# Patient Record
Sex: FEMALE | Race: OTHER
[De-identification: names, ages, dates, MRNs, and addresses within clinical notes are randomized per-mention and may not be internally consistent; named-entity substitution may affect disease eponyms.]

---

## 2019-01-29 ENCOUNTER — HOSPITAL ENCOUNTER (EMERGENCY)
Dept: HOSPITAL 59 - ER | Age: 44
Discharge: HOME | End: 2019-01-29
Payer: MEDICAID

## 2019-01-29 DIAGNOSIS — F17.210: ICD-10-CM

## 2019-01-29 DIAGNOSIS — R10.11: Primary | ICD-10-CM

## 2019-01-29 DIAGNOSIS — R07.89: ICD-10-CM

## 2019-01-29 LAB
ALBUMIN SERPL-MCNC: 4.1 G/DL (ref 4–5)
ALBUMIN/GLOB SERPL: 1.2 {RATIO} (ref 1.1–1.8)
ALP SERPL-CCNC: 39 U/L (ref 45–87)
ALT SERPL-CCNC: 8 U/L (ref ?–33)
ANION GAP SERPL CALC-SCNC: 11 MMOL/L (ref 7–16)
APPEARANCE UR: CLEAR
AST SERPL-CCNC: 10 U/L (ref 10–35)
BASOPHILS NFR BLD: 0.7 % (ref 0–6)
BILIRUB SERPL-MCNC: 0.2 MG/DL (ref 0.2–1)
BILIRUB UR-MCNC: NEGATIVE MG/DL
BUN SERPL-MCNC: 23 MG/DL (ref 6–20)
CO2 SERPL-SCNC: 25 MMOL/L (ref 22–29)
COLOR UR: YELLOW
CREAT SERPL-MCNC: 0.6 MG/DL (ref 0.5–0.9)
EOSINOPHIL NFR BLD: 5.7 % (ref 0–6)
ERYTHROCYTE [DISTWIDTH] IN BLOOD BY AUTOMATED COUNT: 13.6 % (ref 11.5–14.5)
EST GLOMERULAR FILTRATION RATE: > 60 ML/MIN
GLOBULIN SER-MCNC: 3.4 GM/DL (ref 1.4–4.8)
GLUCOSE SERPL-MCNC: 109 MG/DL (ref 74–109)
GLUCOSE UR STRIP-MCNC: NEGATIVE MG/DL
GRANULOCYTES NFR BLD: 58.2 % (ref 47–80)
HCT VFR BLD CALC: 41.9 % (ref 35–47)
HGB BLD-MCNC: 13.4 GM/DL (ref 11.6–16)
KETONES UR QL STRIP: NEGATIVE
LIPASE SERPL-CCNC: 34 U/L (ref 13–60)
LYMPHOCYTES NFR BLD AUTO: 29 % (ref 16–45)
MCH RBC QN AUTO: 33.7 PG (ref 27–33)
MCHC RBC AUTO-ENTMCNC: 32 G/DL (ref 32–36)
MCV RBC AUTO: 105.3 FL (ref 81–97)
MONOCYTES NFR BLD: 6.4 % (ref 0–9)
NITRITE UR QL STRIP: NEGATIVE
PLATELET # BLD: 393 K/UL (ref 130–400)
PMV BLD AUTO: 9.2 FL (ref 7.4–10.4)
PROT SERPL-MCNC: 7.5 G/DL (ref 6.6–8.7)
PROT UR QL STRIP: NEGATIVE
RBC # BLD AUTO: 3.98 M/UL (ref 3.8–5.4)
RBC # UR STRIP: NEGATIVE /UL
URINE LEUKOCYTE ESTERASE: NEGATIVE
UROBILINOGEN UR STRIP-ACNC: 0.2 E.U./DL (ref 0.2–1)
WBC # BLD AUTO: 12.7 K/UL (ref 4.2–12.2)

## 2019-01-29 PROCEDURE — 81003 URINALYSIS AUTO W/O SCOPE: CPT

## 2019-01-29 PROCEDURE — 80053 COMPREHEN METABOLIC PANEL: CPT

## 2019-01-29 PROCEDURE — 74177 CT ABD & PELVIS W/CONTRAST: CPT

## 2019-01-29 PROCEDURE — 85025 COMPLETE CBC W/AUTO DIFF WBC: CPT

## 2019-01-29 PROCEDURE — 99284 EMERGENCY DEPT VISIT MOD MDM: CPT

## 2019-01-29 PROCEDURE — 99283 EMERGENCY DEPT VISIT LOW MDM: CPT

## 2019-01-29 PROCEDURE — 83690 ASSAY OF LIPASE: CPT

## 2019-01-29 NOTE — EMERGENCY DEPARTMENT RECORD
History of Present Illness





- General


Chief complaint: Pain


Stated complaint: RT SIDE RIB PAIN/SWELLING


Time Seen by Provider: 19 20:40


Source: Patient


Mode of Arrival: Ambulatory


Limitations: No limitations





- History of Present Illness


Initial comments: 





44 yo female presents to ED for evaluation of right sided rib/RUQ abdominal 

pain symptoms for the past 1 day.  Patient reports recent fall with rib 

fractures diagnosed approximately 4 weeks ago, denies fevers, chills, nausea, 

or vomiting symptoms.  Patient denies previous abdominal pain symptoms.  

Patient denies health problems at her baseline.


MD Complaint: Abdominal Pain


Onset/Timin


-: Days(s)


History of Same: No


Quality: Aching


Consistency: Constant


Improves with: Nothing


Worsens with: Palpation


Associated Symptoms: Denies other symptoms





- Related Data


 Home Medications











 Medication  Instructions  Recorded  Confirmed  Last Taken


 


Alprazolam [Xanax] 2 mg PO TID PRN 19 Unknown


 


Birth Control 1 tab PO DAILY 19


 


Gabapentin [Neurontin] 300 mg PO TID 19


 


Hydrocodone/Acetaminophen [Norco 1 tab PO Q8H PRN 19 Unknown





10mg/325mg]    


 


Lithium Carbonate 300 mg PO TID 19











 Allergies











Allergy/AdvReac Type Severity Reaction Status Date / Time


 


cephalexin [From Keflex] Allergy  RASH Verified 19 20:43


 


ciprofloxacin [From Cipro] Allergy  RASH Verified 19 20:43














Review of Systems


Constitutional: Denies: Chills, Fever, Malaise, Night sweats


Eyes: Denies: Eye discharge, Eye pain


ENT: Denies: Congestion, Ear pain, Epistaxis


Respiratory: Denies: Cough, Dyspnea


Cardiovascular: Reports: Chest pain.  Denies: Dyspnea on exertion, Palpitations


Endocrine: Denies: Fatigue, Heat or cold intolerance


Gastrointestinal: Reports: Abdominal pain.  Denies: Constipation, Nausea, 

Vomiting


Genitourinary: Denies: Incontinence, Retention


Musculoskeletal: Denies: Arthralgia, Back pain, Gout, Joint swelling


Skin: Denies: Bruising, Change in color


Neurological: Denies: Abnormal gait, Confusion, Headache, Seizure


Psychiatric: Denies: Anxiety


Hematological/Lymphatic: Denies: Anemia, Blood Clots





Physical Exam





- General


General Appearance: Alert, Oriented x3, Cooperative, Anxious


Limitations: No limitations





- Head


Head exam: Atraumatic, Normocephalic, Normal inspection


Head exam detail: negative: Abrasion, Contusion, Wyatt's sign, General 

tenderness, Hematoma, Laceration





- Eye


Eye exam: Normal appearance.  negative: Conjunctival injection, Periorbital 

swelling, Periorbital tenderness, Scleral icterus





- ENT


Ear exam: negative: Auricular hematoma, Auricular trauma


Nasal Exam: negative: Active bleeding, Discharge, Dried blood, Foreign body


Mouth exam: negative: Drooling, Laceration, Muffled voice, Tongue elevation





- Neck


Neck exam: Normal inspection.  negative: Meningismus, Tenderness





- Respiratory


Respiratory exam: Normal lung sounds bilaterally, Chest wall tenderness (Right 

lower chest wall, RUQ abdomen).  negative: Rales, Respiratory distress, Rhonchi

, Stridor





- Cardiovascular


Cardiovascular Exam: Regular rate, Normal rhythm, Normal heart sounds





- GI/Abdominal


GI/Abdominal exam: Soft, Tenderness (TTP RUQ, no rebound or guarding symptoms 

are present on examination.).  negative: Rebound, Rigid





- Rectal


Rectal exam: Deferred





- 


 exam: Deferred





- Extremities


Extremities exam: Normal inspection.  negative: Calf tenderness, Pedal edema, 

Tenderness





- Back


Back exam: Denies: CVA tenderness (R), CVA tenderness (L)





- Neurological


Neurological exam: Alert, Normal gait, Oriented X3





- Psychiatric


Psychiatric exam: Normal affect, Normal mood





- Skin


Skin exam: Normal color.  negative: Abrasion


Type of lesion: negative: abrasion





Course





- Reevaluation(s)


Reevaluation #1: 





19 21:29


Laboratory studies were reviewed, WBC 12.7, labs are otherwise grossly 

unremarkable for an acute process.


Reevaluation #2: 





19 21:50


CT Abdomen and Pelvis:


No acute process


No cholithiasis, cholecystitis





Patient was updated on all results, recommended f/u with the patient's PCP for 

further evaluation of the gallbladder as an outpatient.


Patient is resting comfortably on re-examination.


Patient appears stable for discharge at this time.





Medical Decision Making





- Lab Data


Result diagrams: 


 19 21:04





 19 21:04





Disposition


Disposition: Discharge


Clinical Impression: 


 RUQ pain





Disposition: Home, Self-Care


Condition: (2) Stable


Instructions:  Abdominal Pain (ED)


Additional Instructions: 


Return to ED if your symptoms worsen or if you have any concerns.


Ibuprofen as directed.


Follow-up with your family doctor in 3-5 days as directed.


Forms:  Patient Portal Access


Time of Disposition: 21:53





Quality





- Quality Measures


Quality Measures: N/A





- Blood Pressure Screening


Does Patient Have Any of the Following: No


Blood Pressure Classification: Hypertensive Reading


Systolic Measurement: 155


Diastolic Measurement: 99


Screening for High Blood Pressure: < First Hypertensive BP, F/U Documented > [

]


First Hypertensive Follow-up Interventions: Referral to alternative/primary 

care provider.

## 2019-01-31 NOTE — CT SCAN REPORT
EXAM:  CT OF THE ABDOMEN AND PELVIS WITH CONTRAST 



HISTORY:  RIGHT UPPER QUADRANT PAIN.  HISTORY OF RIGHT RIB FRACTURES.  



TECHNIQUE:  Contrast enhanced helical CT examination of the abdomen and pelvis 
was performed including delayed images through the kidneys with 100 ml of 
Omnipaque 300 utilized.  Oral contrast was not utilized limiting evaluation of 
bowel.  



FINDINGS:  The lung bases are clear.  No pleural or pericardial effusion.  The 
heart is not enlarged.  



The liver, spleen, pancreas, and adrenal glands are normal in appearance.  The 
right kidney is somewhat malrotated.  There is a tiny calcification in the mid 
to upper right kidney centrally.  It is indeterminate whether this is a 
vascular calcification or a nonobstructing calculus.  There is a too small to 
characterize hypodense focus at the posterior aspect of the mid left kidney 
measuring 3 mm.  This is nonspecific.  The kidneys are otherwise normal in 
appearance.  No gross hydronephrosis is seen.  There is a tiny calcification in 
the lower right hemipelvis which is in close proximity to the vesicoureteral 
junction measuring approximately 2 mm.  A calculus without upstream dilatation 
cannot be excluded.  



No calcified gallstone is seen.  No gross gallbladder wall thickening or 
pericholecystic fluid.  No gross biliary ductal dilatation.  No intraabdominal 
nor retroperitoneal lymphadenopathy.  The portal vein and splenic vein are 
patent.  The aorta and its branches are normal in appearance.  



The uterus is retroflexed.  No definite pelvic mass or adenopathy.  Fluid 
density areas are visualized within the right adnexa with the largest measuring 
1.9 cm.  It is indeterminate whether these relate to fluid distended small 
bowel loops or ovarian follicles.  No gross free fluid in the cul-de-sac.  



No bowel dilatation nor bowel wall thickening.  The appendix is at least 
partially visualized and normal in appearance.  



No lytic or blastic bone lesion.  



No acute osseous fracture is seen.  



IMPRESSION:  

1.  NO DEFINITE CT EVIDENCE OF AN ACUTE INTRAABDOMINAL NOR INTRAPELVIC PROCESS.
  



2.  MILDLY MALROTATED RIGHT KIDNEY.  TINY NONOBSTRUCTING CALCULUS VERSUS 
VASCULAR CALCIFICATION IN THE MID TO UPPER RIGHT KIDNEY.  TINY CALCIFICATION 
NEAR THE EXPECTED LEVEL OF THE RIGHT VESICOURETERAL JUNCTION, LIKELY VASCULAR 
IN ORIGIN THOUGH A NONOBSTRUCTING CALCULUS IS NOT ENTIRELY EXCLUDED.  



3.  TOO SMALL TO CHARACTERIZE HYPODENSE LESION WITHIN THE POSTERIOR LEFT MID 
KIDNEY.  THIS IS NONSPECIFIC, BUT LIKELY A CYST.  



4.  NO ACUTE OSSEOUS FRACTURE.  



5.  SMALL FLUID DENSITY AREAS WITHIN THE RIGHT ADNEXA.  IT IS INDETERMINATE 
WHETHER THESE RELATE TO FLUID DISTENDED SMALL BOWEL LOOPS OR OVARIAN FOLLICLES.
  



ADDENDUM:  Not mentioned in the original report is a tiny 2 small to 
characterize hypodensity in the posterior segment of the right liver lobe as 
seen on series 3 image 52.  This measures 3 mm.  It is nonspecific, but likely 
a cyst or a hemangioma.  



JOB NUMBER:  [id number]
[facility]

## 2020-02-23 ENCOUNTER — HOSPITAL ENCOUNTER (EMERGENCY)
Dept: HOSPITAL 59 - ER | Age: 45
Discharge: HOME | End: 2020-02-23
Payer: MEDICARE

## 2020-02-23 DIAGNOSIS — F17.210: ICD-10-CM

## 2020-02-23 DIAGNOSIS — R10.2: Primary | ICD-10-CM

## 2020-02-23 LAB
ABSOLUTE NEUTROPHIL COUNT: 6.82
ALBUMIN SERPL-MCNC: 3.8 G/DL (ref 4–5)
ALBUMIN/GLOB SERPL: 1.5 {RATIO} (ref 1.1–1.8)
ALP SERPL-CCNC: 47 U/L (ref 35–104)
ALT SERPL-CCNC: 18 U/L (ref ?–33)
ANION GAP SERPL CALC-SCNC: 10 MMOL/L (ref 7–16)
APPEARANCE UR: CLEAR
AST SERPL-CCNC: 18 U/L (ref 10–35)
BASOPHILS NFR BLD: 0.6 % (ref 0–6)
BILIRUB SERPL-MCNC: 0.2 MG/DL (ref 0.2–1)
BILIRUB UR-MCNC: NEGATIVE MG/DL
BUN SERPL-MCNC: 18 MG/DL (ref 6–20)
CO2 SERPL-SCNC: 25 MMOL/L (ref 22–29)
COLOR UR: YELLOW
CREAT SERPL-MCNC: 0.8 MG/DL (ref 0.5–0.9)
EOSINOPHIL NFR BLD: 3.5 % (ref 0–6)
ERYTHROCYTE [DISTWIDTH] IN BLOOD BY AUTOMATED COUNT: 12.6 % (ref 11.5–14.5)
EST GLOMERULAR FILTRATION RATE: > 60 ML/MIN
GLOBULIN SER-MCNC: 2.5 GM/DL (ref 1.4–4.8)
GLUCOSE SERPL-MCNC: 96 MG/DL (ref 74–109)
GLUCOSE UR STRIP-MCNC: NEGATIVE MG/DL
GRANULOCYTES NFR BLD: 59.1 % (ref 47–80)
HCG,QUALITATIVE URINE: NEGATIVE
HCT VFR BLD CALC: 41.2 % (ref 35–47)
HGB BLD-MCNC: 13.1 GM/DL (ref 11.6–16)
KETONES UR QL STRIP: NEGATIVE
LIPASE SERPL-CCNC: 38 U/L (ref 13–60)
LYMPHOCYTES NFR BLD AUTO: 29.8 % (ref 16–45)
MCH RBC QN AUTO: 33.9 PG (ref 27–33)
MCHC RBC AUTO-ENTMCNC: 31.8 G/DL (ref 32–36)
MCV RBC AUTO: 106.5 FL (ref 81–97)
MONOCYTES NFR BLD: 7 % (ref 0–9)
NITRITE UR QL STRIP: NEGATIVE
PLATELET # BLD: 397 K/UL (ref 130–400)
PMV BLD AUTO: 8.9 FL (ref 7.4–10.4)
PROT SERPL-MCNC: 6.3 G/DL (ref 6.6–8.7)
PROT UR QL STRIP: NEGATIVE
RBC # BLD AUTO: 3.87 M/UL (ref 3.8–5.4)
RBC # UR STRIP: NEGATIVE /UL
URINE LEUKOCYTE ESTERASE: NEGATIVE
UROBILINOGEN UR STRIP-ACNC: 0.2 E.U./DL (ref 0.2–1)
WBC # BLD AUTO: 11.6 K/UL (ref 4.2–12.2)

## 2020-02-23 PROCEDURE — 81025 URINE PREGNANCY TEST: CPT

## 2020-02-23 PROCEDURE — 81003 URINALYSIS AUTO W/O SCOPE: CPT

## 2020-02-23 PROCEDURE — 96374 THER/PROPH/DIAG INJ IV PUSH: CPT

## 2020-02-23 PROCEDURE — 99284 EMERGENCY DEPT VISIT MOD MDM: CPT

## 2020-02-23 PROCEDURE — 80053 COMPREHEN METABOLIC PANEL: CPT

## 2020-02-23 PROCEDURE — 87210 SMEAR WET MOUNT SALINE/INK: CPT

## 2020-02-23 PROCEDURE — 83690 ASSAY OF LIPASE: CPT

## 2020-02-23 PROCEDURE — 85025 COMPLETE CBC W/AUTO DIFF WBC: CPT

## 2020-02-23 NOTE — EMERGENCY DEPARTMENT RECORD
History of Present Illness





- General


Chief Complaint: Abdominal Pain


Stated Complaint: PELVIC PAIN


Time Seen by Provider: 02/23/20 20:45


Source: Patient


Mode of Arrival: Ambulatory


Limitations: No limitations





- History of Present Illness


Initial Comments: 





43 yo female presents with lower abdominal pain.   The pain has been coming and 

going over the last week.  She denies and fever.  The pain is sharp and crampy 

and comes in waves.  The pain feels like it is in her "lady parts".  She states 

she had two cycles in the last month.  This has not occurred before.  She denies

any vagina discharge and she is not bleeding at this time.  She contacted her P

CP.  She placed her on Flagyl that she is currently taking.  She sees a GYN to 

monitor her PAP smears.  She denies any abnormal PAP smears in the past.  No 

diarrhea.  No fever.  No dysuria.  The pain goes across the lower abdomen 

equally.  No known history of diverticulitis.  She had a CT scan in January of 2019 at [facility].  


MD Complaint: Abdominal pain


-: Week(s) (1)


Location: Suprapubic


Radiation: Suprapubic


Migration to: Suprapubic


Quality: Aching, Sharp


Consistency: Intermittent


Improves With: Nothing


Worsens With: Movement


Associated Symptoms: Other (2 menstrual cycles in a month)





- Related Data


                                Home Medications











 Medication  Instructions  Recorded  Confirmed  Last Taken


 


Levothyroxine Sodium 50 mcg PO DAILY 02/23/20 02/23/20 02/23/20











                                    Allergies











Allergy/AdvReac Type Severity Reaction Status Date / Time


 


cephalexin [From Keflex] Allergy  RASH Verified 02/23/20 20:48


 


ciprofloxacin [From Cipro] Allergy  RASH Verified 02/23/20 20:48














Review of Systems


Constitutional: Denies: Chills, Fever, Malaise, Weakness


Eyes: Denies: Eye discharge


ENT: Denies: Congestion, Throat pain


Respiratory: Denies: Cough, Dyspnea, Wheezes


Cardiovascular: Denies: Chest pain, Palpitations, Syncope


Endocrine: Denies: Fatigue, Polydipsia, Polyuria


Gastrointestinal: Reports: As per HPI, Abdominal pain.  Denies: Constipation, 

Diarrhea, Hematemesis, Hematochezia, Melena, Nausea, Vomiting


Genitourinary: Reports: Abnormal menses, Dyspareunia.  Denies: Discharge, Dys

uria, Frequency, Hematuria, Incontinence, Retention, Urgency


Musculoskeletal: Denies: Arthralgia, Back pain, Joint swelling, Myalgia, Neck 

pain


Skin: Denies: Bruising, Change in color, Rash


Neurological: Denies: Headache, Numbness, Weakness


Psychiatric: Denies: Anxiety


Hematological/Lymphatic: Denies: Easy bleeding, Easy bruising





Past Medical History





- SOCIAL HISTORY


Smoking Status: Current every day smoker


Drug Use: Occasional


Drug Use Detail:: Marijuana





- RESPIRATORY


Hx Respiratory Disorders: No





- CARDIOVASCULAR


Hx Cardio Disorders: No





- NEURO


Hx Neuro Disorders: Yes


Hx Neuropathy: Yes





- GI


Hx GI Disorders: Yes


Hx Ulcer: Yes





- 


Hx Genitourinary Disorders: No





- ENDOCRINE


Hx Endocrine Disorders: No





- MUSCULOSKELETAL


Hx Musculoskeletal Disorders: Yes


Hx Back Injury: Yes (Fx 2016)


Comment:: Nerve problems d/t back fx's





- PSYCH


Hx Psych Problems: Yes


Hx Anxiety: Yes (Panic attacks)


Comment:: Bipolar





- HEMATOLOGY/ONCOLOGY


Hx Hematology/Oncology Disorders: No





Family Medical History


Hx Cancer: Father, Mother


Hx Diabetes: Grandparents


Hx Heart Disease: Father, Grandparents





Physical Exam





- General


General Appearance: Alert, Oriented x3, Cooperative, No acute distress


Limitations: No limitations





- Head


Head exam: Atraumatic, Normal inspection





- Eye


Eye exam: Normal appearance, PERRL.  negative: Conjunctival injection, Scleral 

icterus





- ENT


ENT exam: Normal exam, Mucous membranes moist


Ear exam: Normal external inspection


Nasal Exam: Normal inspection


Mouth exam: Normal external inspection





- Neck


Neck exam: Normal inspection





- Respiratory


Respiratory exam: Normal lung sounds bilaterally.  negative: Respiratory 

distress, Rhonchi, Stridor, Wheezes





- Cardiovascular


Cardiovascular Exam: Regular rate, Normal rhythm, Normal heart sounds





- GI/Abdominal


GI/Abdominal exam: Soft





- Rectal


Rectal exam: Deferred





- Extremities


Extremities exam: Normal inspection.  negative: Pedal edema, Tenderness





- Back


Back exam: Denies: CVA tenderness (R), CVA tenderness (L)





- Neurological


Neurological exam: Alert, Oriented X3





- Psychiatric


Psychiatric exam: Normal affect, Normal mood





- Skin


Skin exam: Dry, Intact, Normal color, Warm





Course





- Reevaluation(s)


Reevaluation #1: 





02/23/20 20:47


The EMR was reviewed


The patient had a CT scan of the abdomen and pelvis on 1/29/2019.  No definite 

acute process.  Questionable Non obstructing calculi R kidney, small fluid 

density R adnexa.  SB vs ovarian.  2 small hypodense liver cysts.  ( A copy of 

this report was provided to the patient for her follow up with her PCP who is in

[place])


02/23/20 21:52


The CBC was reviewed and is normal


The CMP is normal


The UA is negative


The HCG is negative


The pain is vague, comes and goes, associated with two menstrual cycles this 

month.  NO fever.  Not consistent with diverticulitis.  No GI symptoms.


Given her pelvic pain and two cycles I recommemnd return in AM for an US.  

Radiology was contacted.  She can return at 7:30am for an 8am US.  


02/23/20 21:55


Wet prep is negative


She is stable for DC


I instructed her to immediately go to a larger hospital with ultrasound if she 

is worse in the middle of the night


02/23/20 22:12








Medical Decision Making





- Lab Data


Result diagrams: 


                                 02/23/20 21:05





                                 02/23/20 21:05





Disposition


Disposition: Discharge


Clinical Impression: 


 Pelvic pain





Disposition: Home, Self-Care


Condition: (1) Good


Instructions:  Pelvic Pain (ED)


Additional Instructions: 


Return to the ED by 7:30am for an 8am ultrasound


Drink 32 ounces of water 1 hour before arrival and do not urinate.  Hold the 

fluid in your bladder


Forms:  Patient Portal Access


Time of Disposition: 21:54





Quality





- Quality Measures


Quality Measures: N/A





- Blood Pressure Screening


Does Patient Have Any of the Following: No


Blood Pressure Classification: Normal BP Reading


Systolic Measurement: 114


Diastolic Measurement: 79


Screening for High Blood Pressure: < Normal BP, F/U Not Required > []


Pre-Hypertensive Follow-up Interventions: Referral to alternative/primary care 

provider.

## 2020-02-24 ENCOUNTER — HOSPITAL ENCOUNTER (EMERGENCY)
Dept: HOSPITAL 59 - ER | Age: 45
Discharge: HOME | End: 2020-02-24
Payer: MEDICARE

## 2020-02-24 DIAGNOSIS — F17.210: ICD-10-CM

## 2020-02-24 DIAGNOSIS — R10.2: Primary | ICD-10-CM

## 2020-02-24 DIAGNOSIS — R10.30: ICD-10-CM

## 2020-02-24 DIAGNOSIS — N83.291: Primary | ICD-10-CM

## 2020-02-24 DIAGNOSIS — R10.2: ICD-10-CM

## 2020-02-24 LAB
ABSOLUTE NEUTROPHIL COUNT: 6.16
ABSOLUTE NEUTROPHIL COUNT: 7.47
BASOPHILS NFR BLD: 0.5 % (ref 0–6)
BASOPHILS NFR BLD: 0.5 % (ref 0–6)
EOSINOPHIL NFR BLD: 4.8 % (ref 0–6)
EOSINOPHIL NFR BLD: 5.8 % (ref 0–6)
ERYTHROCYTE [DISTWIDTH] IN BLOOD BY AUTOMATED COUNT: 12.8 % (ref 11.5–14.5)
ERYTHROCYTE [DISTWIDTH] IN BLOOD BY AUTOMATED COUNT: 12.9 % (ref 11.5–14.5)
GRANULOCYTES NFR BLD: 54.1 % (ref 47–80)
GRANULOCYTES NFR BLD: 64.4 % (ref 47–80)
HCT VFR BLD CALC: 39.1 % (ref 35–47)
HCT VFR BLD CALC: 42.7 % (ref 35–47)
HGB BLD-MCNC: 13.1 GM/DL (ref 11.6–16)
HGB BLD-MCNC: 14.4 GM/DL (ref 11.6–16)
LYMPHOCYTES NFR BLD AUTO: 22.3 % (ref 16–45)
LYMPHOCYTES NFR BLD AUTO: 33.7 % (ref 16–45)
MCH RBC QN AUTO: 35.1 PG (ref 27–33)
MCH RBC QN AUTO: 35.1 PG (ref 27–33)
MCHC RBC AUTO-ENTMCNC: 33.5 G/DL (ref 32–36)
MCHC RBC AUTO-ENTMCNC: 33.7 G/DL (ref 32–36)
MCV RBC AUTO: 104.1 FL (ref 81–97)
MCV RBC AUTO: 104.8 FL (ref 81–97)
MONOCYTES NFR BLD: 5.9 % (ref 0–9)
MONOCYTES NFR BLD: 8 % (ref 0–9)
PLATELET # BLD: 373 K/UL (ref 130–400)
PLATELET # BLD: 397 K/UL (ref 130–400)
PMV BLD AUTO: 8.7 FL (ref 7.4–10.4)
PMV BLD AUTO: 8.9 FL (ref 7.4–10.4)
RBC # BLD AUTO: 3.73 M/UL (ref 3.8–5.4)
RBC # BLD AUTO: 4.1 M/UL (ref 3.8–5.4)
WBC # BLD AUTO: 11.4 K/UL (ref 4.2–12.2)
WBC # BLD AUTO: 11.6 K/UL (ref 4.2–12.2)

## 2020-02-24 PROCEDURE — 76830 TRANSVAGINAL US NON-OB: CPT

## 2020-02-24 PROCEDURE — 76856 US EXAM PELVIC COMPLETE: CPT

## 2020-02-24 PROCEDURE — 85025 COMPLETE CBC W/AUTO DIFF WBC: CPT

## 2020-02-24 PROCEDURE — 99283 EMERGENCY DEPT VISIT LOW MDM: CPT

## 2020-02-24 PROCEDURE — 74176 CT ABD & PELVIS W/O CONTRAST: CPT

## 2020-02-24 PROCEDURE — 86140 C-REACTIVE PROTEIN: CPT

## 2020-02-24 PROCEDURE — 96372 THER/PROPH/DIAG INJ SC/IM: CPT

## 2020-02-24 NOTE — CT SCAN REPORT
EXAMINATION: CT Abdomen and Pelvis without Contrast

EXAM DATE:  2/24/2020 6:30 PM



TECHNIQUE: Spiral CT images were obtained from the lung bases to the ischial tuberosities without int
ravenous contrast.  Coronal and sagittal 2-D reconstructions were made from source images.



INDICATION:  lowe AP LOWER ABD PAIN X 1 WEEK

COMPARISON: 1/29/2019.

_________________________



FINDINGS: Evaluation of solid organs is degraded due to lack of intravenous contrast. 



Abdomen:

There is a small right intrarenal stone. No ureteric stones or hydronephrosis. The kidneys appear oth
erwise normal. The liver, spleen, pancreas, and adrenals are normal. No free fluid or free air.  Subo
ptimal evaluation of bowel due to nondistention and lack of oral contrast. No bowel dilation.



Pelvis:

A large right ovarian cyst is noted. The pelvic organs are otherwise unremarkable. No free fluid or f
ree air. No inflammatory change. The appendix is normal. No bowel dilation.

_________________________



IMPRESSION:

1. Right intrarenal stone.

2. Right ovarian cyst.

3. No acute abnormality.



Dictated by: Iron Rosa MD on 2/24/2020 7:02 PM.

Electronically signed by: Iron Rosa MD on 2/24/2020 7:12 PM.

## 2020-02-24 NOTE — EMERGENCY DEPARTMENT RECORD
History of Present Illness





- General


Chief Complaint: Recheck - Other


Stated Complaint: CT


Time Seen by Provider: 20 17:49


Source: Patient


Mode of arrival: Ambulatory


Limitations: No limitations





- History of Present Illness


Initial Comments: 


The patient is here due to a hx of lower AP for just over a week. She has had no

nausea or vomiting with it. The pain is sharp and crampy and intermittent. She 

has had a normal appetite during the episodes of the pain and has had no 

diarrhea, fever, or dysuria. The patient was here last evening and had neg lab 

work and a neg pelvic exam. The patient also returned this AM for a pelvic US 

which did not demonstrate any cause of the pain. I did see her this AM and did 

recommend an abdominal CT but she was not able to stay for it and just returned 

now for the scan. Presently the patient is very hungry at this time.





MD Complaint: Other


Onset/Timin


-: Week(s)


Initial Visit For: Other


Returns Today for: Other


Symptoms Since Prior Visit: No new symptoms


Associated Symptoms: Abdominal pain


Treatments Prior to Arrival: Given pain medications on initial visit





- Related Data


                                    Allergies











Allergy/AdvReac Type Severity Reaction Status Date / Time


 


cephalexin [From Keflex] Allergy  RASH Verified 20 20:48


 


ciprofloxacin [From Cipro] Allergy  RASH Verified 20 20:48














Travel/Exposure Screening





- Travel/Exposure Within Last 30 Days


Have you traveled within the last 30 days?: No





- Additonal Travel/Exposure Details


Have you been exposed to anyone with a communicable illness?: No





Review of Systems


Constitutional: Denies: Chills, Fever





Past Medical History





- SOCIAL HISTORY


Smoking Status: Current every day smoker





- RESPIRATORY


Hx Respiratory Disorders: No





- CARDIOVASCULAR


Hx Cardio Disorders: No





- NEURO


Hx Neuro Disorders: Yes


Hx Neuropathy: Yes





- GI


Hx GI Disorders: Yes


Hx Ulcer: Yes





- 


Hx Genitourinary Disorders: No





- ENDOCRINE


Hx Endocrine Disorders: No





- MUSCULOSKELETAL


Hx Musculoskeletal Disorders: Yes


Hx Back Injury: Yes (Fx )


Comment:: Nerve problems d/t back fx's





- PSYCH


Hx Psych Problems: Yes


Hx Anxiety: Yes (Panic attacks)


Comment:: Bipolar





- HEMATOLOGY/ONCOLOGY


Hx Hematology/Oncology Disorders: No





Family Medical History


Any Significant Family History?: Yes


Hx Cancer: Father, Mother


Hx Diabetes: Grandparents


Hx Heart Disease: Father, Grandparents





Physical Exam





- General


General Appearance: Alert, Oriented x3, Cooperative, No acute distress (The 

patient appears very comfortable at this time and is eating a full meal when I w

alked into the room.)





- Head


Head exam: Atraumatic, Normocephalic





- Eye


Eye exam: Normal appearance, PERRL





- Neck


Neck exam: Normal inspection, Full ROM.  negative: Tenderness





- Respiratory


Respiratory exam: Normal lung sounds bilaterally.  negative: Respiratory 

distress





- Cardiovascular


Cardiovascular Exam: Regular rate, Normal rhythm, Normal heart sounds





- GI/Abdominal


GI/Abdominal exam: Soft, Normal bowel sounds, Tenderness (There is diffuse mild 

lower abdominal tenderness.).  negative: Distended, Guarding, Rebound, Rigid





- Extremities


Extremities exam: Normal inspection, Full ROM, Normal capillary refill.  

negative: Tenderness





Course





                                   Vital Signs











  20





  17:13


 


Temperature 98.2 F


 


Pulse Rate 78


 


Respiratory 18





Rate 


 


Blood Pressure 153/93


 


Pulse Ox 100














- Reevaluation(s)


Reevaluation #1: 


The patient is doing very well at this time. She is presently "starving" and has

a very good appetite. Presently the pain is much better. On exam her abdomen is 

very soft and nontender in all 4 quads. I did explain the fact that the 

abdominal CT does appear normal. She is to see her PCP later this week for 

recheck and to return to the ER for any worsening issues.


20 19:18








Medical Decision Making





- Data Complexity


MDM Data: Labs Ordered and/or Reviewed, X-Ray Ordered and/or Reviewed





- Lab Data


Result diagrams: 


                                 20 18:15








- Radiology Data


Radiology results: Report reviewed (Abd/pelvis CT: Neg for any acute changes.)





Disposition


Disposition: Discharge


Clinical Impression: 


 Pelvic pain





Disposition: Home, Self-Care


Condition: (2) Stable


Instructions:  Abdominal Pain (ED)


Additional Instructions: 


Please continue your regular medicines and please see your doctor later this 

week for recheck. Return to the ER for any worsening issues, pain, fever, or 

vomiting.


Forms:  Patient Portal Access


Time of Disposition: 19:18





Quality





- Quality Measures


Quality Measures: N/A





- Blood Pressure Screening


View Details: Yes


Does Patient Have Any of the Following: No


Blood Pressure Classification: Hypertensive Reading


Systolic Measurement: 153


Diastolic Measurement: 93


Screening for High Blood Pressure: < First Hypertensive BP, F/U Documented > 

[]


First Hypertensive Follow-up Interventions: Referral to alternative/primary care

 provider.

## 2020-02-24 NOTE — EMERGENCY DEPARTMENT RECORD
History of Present Illness





- General


Chief Complaint: Recheck - Other


Stated Complaint: ULTRASOUND


Time Seen by Provider: 20 07:45


Source: Patient


Mode of arrival: Ambulatory


Limitations: No limitations





- History of Present Illness


Initial Comments: 


The patient is here due to a one week hx of pelvic pain. The pain is sharp and 

crampy at times and is intermittent. There is no nausea, vomiting, fever, 

diarrhea, dysuria, or vaginal bleeding or discharge. The patient was in the ER 

for this last night and had neg lab work and a neg pelvic exam. She is now here 

for a pelvic US. 





MD Complaint: Other


Onset/Timin


-: Week(s)


Initial Visit For: Other


Returns Today for: Other


Symptoms Since Prior Visit: No new symptoms


Associated Symptoms: Abdominal pain





- Related Data


                                    Allergies











Allergy/AdvReac Type Severity Reaction Status Date / Time


 


cephalexin [From Keflex] Allergy  RASH Verified 20 20:48


 


ciprofloxacin [From Cipro] Allergy  RASH Verified 20 20:48














Travel/Exposure Screening





- Travel/Exposure Within Last 30 Days


Have you traveled within the last 30 days?: No





- Additonal Travel/Exposure Details


Have you been exposed to anyone with a communicable illness?: No





Review of Systems


Constitutional: Denies: Chills, Fever


Eyes: Denies: Eye discharge


ENT: Denies: Congestion


Respiratory: Denies: Cough, Dyspnea





Past Medical History





- SOCIAL HISTORY


Smoking Status: Current every day smoker





- RESPIRATORY


Hx Respiratory Disorders: No





- CARDIOVASCULAR


Hx Cardio Disorders: No





- NEURO


Hx Neuro Disorders: Yes


Hx Neuropathy: Yes





- GI


Hx GI Disorders: Yes


Hx Ulcer: Yes





- 


Hx Genitourinary Disorders: No





- ENDOCRINE


Hx Endocrine Disorders: No





- MUSCULOSKELETAL


Hx Musculoskeletal Disorders: Yes


Hx Back Injury: Yes (Fx )


Comment:: Nerve problems d/t back fx's





- PSYCH


Hx Psych Problems: Yes


Hx Anxiety: Yes (Panic attacks)


Comment:: Bipolar





- HEMATOLOGY/ONCOLOGY


Hx Hematology/Oncology Disorders: No





Family Medical History


Any Significant Family History?: Yes


Hx Cancer: Father, Mother


Hx Diabetes: Grandparents


Hx Heart Disease: Father, Grandparents





Physical Exam





- General


General Appearance: Alert, Oriented x3, Cooperative, No acute distress





- Head


Head exam: Atraumatic, Normocephalic, Normal inspection





- Eye


Eye exam: Normal appearance, PERRL





- Neck


Neck exam: Normal inspection, Full ROM.  negative: Tenderness





- Respiratory


Respiratory exam: Normal lung sounds bilaterally.  negative: Respiratory 

distress





- Cardiovascular


Cardiovascular Exam: Regular rate, Normal rhythm, Normal heart sounds





- GI/Abdominal


GI/Abdominal exam: Soft, Normal bowel sounds, Tenderness (There is diffuse lower

abdominal/ pelvic tenderness with what appears to be a full bladder.).  

negative: Guarding, Rebound, Rigid





- Extremities


Extremities exam: Normal inspection, Full ROM, Normal capillary refill.  

negative: Tenderness





Course





                                   Vital Signs











  20





  07:32


 


Temperature 97.5 F L


 


Pulse Rate 79


 


Respiratory 22





Rate 


 


Blood Pressure 162/127


 


Pulse Ox 100














- Reevaluation(s)


Reevaluation #1: 


The patient is doing better at this time. She is back from US and we are waiting

on her results.


20 09:14





Reevaluation #2: 


The patient is doing better at this time. She was sleeping in the room when I 

entered. We are still waiting on her US report and I did update her of that 

fact.


20 09:59





Reevaluation #3: 


The patient is doing better at this time but still having pain. I did discuss 

the neg US with the patient but did relay the finding of the simple cyst on the 

R. Due to no specific diagnosis found I did recommend and abdominal and pelvic 

CT to R/O Appendicitis or any other surgical pathology or mass. I explained to 

the patient that by NOT doing the test she could have a delay in diagnosis of a 

surgical condition which could lead to bowel infection, perforation, sepsis, 

need for a colostomy and even death. The patient states she has to leave and 

cannot stay for the test. I did discuss the need to see her doctor tomorrow for 

recheck and to return to the ER if she changes her mind or for any worsening 

symptoms.





On recheck exam her temp is normal and her abdomen is soft with mild bilateral 

lower abdominal tenderness.


20 11:06





Reevaluation #4: 


I did give the patient her US report that did show the R ovarian cyst. I again 

did discuss the need for a CT scan but the patient cannot stay for that. She 

presently does have proper decision making capacity and fully understands the 

risks of refusing and accepts the risks. She states she will return tonight or 

tomorrow to have the test performed.


20 11:24








Medical Decision Making





- Data Complexity


MDM Data: Labs Ordered and/or Reviewed, X-Ray Ordered and/or Reviewed





- Lab Data


Result diagrams: 


                                 20 07:55








- Radiology Data


Radiology results: Report reviewed (US: 4.4 cm simple cyst R ovary, O/W neg.)





Disposition


Disposition: Discharge


Clinical Impression: 


 Pelvic pain





Disposition: Home, Self-Care


Condition: (2) Stable


Instructions:  Abdominal Pain (ED)


Additional Instructions: 


Please continue your regular medicines and please see your doctor tomorrow for 

recheck. Return to the ER for any fever, worse pain, vomiting or bleeding. 

Please also see your family doctor to have your elevated blood pressure 

evaluated further and for possible GYN referral.


Forms:  Patient Portal Access


Time of Disposition: 11:14





Quality





- Quality Measures


Quality Measures: N/A





- Blood Pressure Screening


View Details: Yes


Does Patient Have Any of the Following: No


Blood Pressure Classification: Hypertensive Reading


Systolic Measurement: 162


Diastolic Measurement: 127


Screening for High Blood Pressure: < First Hypertensive BP, F/U Documented > 

[]


First Hypertensive Follow-up Interventions: Referral to alternative/primary care

 provider.

## 2020-02-24 NOTE — ULTRASOUND REPORT
EXAMINATION: Complete Transabdominal and Endovaginal Ultrasound of the Pelvis 

EXAM DATE: 2/24/2020 8:58 AM



TECHNIQUE:  Endovaginal ultrasound imaging was performed after the transabdominal exam for better res
olution.  

 

INDICATION: pain

COMPARISON: CT scan 1/29/2019

____________________



Transabdominal Ultrasound of the Pelvis Findings:

1.  Uterus Size: The uterus measures 7.3 x 3.4 x 5.3 cm in dimension (length x AP x width).

2.  Endometrium: The endometrium images poorly transabdominally.  The visualized endometrium measures
 5.5 mm in thickness.

3.  Myometrium: The myometrium images poorly transabdominally.   The myometrium is unremarkable.

4.  Ovaries: The right ovary measures 5.3 x 5.8 x 5.0 cm in dimension. The left ovary measures 2.3 x 
2.3 x 2.8 cm in dimension.

5.  Bilateral Adnexa: Simple appearing 4.4 cm cyst of the right ovary.

6.  Other Findings: None.



Transvaginal Ultrasound of the Pelvis Findings:

1.  Uterus Size: Normal.

2.  Endometrium: The endometrium measures 9 mm in thickness. The endometrium is normal.

3.  Myometrium: Normal.

4.  Ovaries: The right ovary measures 5.5 x 4.6 x 6.1 cm . The left ovary measures 3.5 x 1.8 x 1.9 cm
.

5.  Other Findings:  Multiple follicles are seen of both ovaries. There is a 4.4 cm mostly anechoic c
yst of the right ovary with a thin wall and smooth inner wall.     



Doppler Imaging:  Not indicated.

____________________



Impression: 



4.4 cm mostly simple cyst of the right ovary, almost certainly benign. No further follow-up required.




No additional acute pelvic pathology.       



Dictated by: David Whittington MD on 2/24/2020 9:52 AM.

Electronically signed by: David Whittington MD on 2/24/2020 9:57 AM.